# Patient Record
Sex: FEMALE | Race: BLACK OR AFRICAN AMERICAN | NOT HISPANIC OR LATINO | URBAN - METROPOLITAN AREA
[De-identification: names, ages, dates, MRNs, and addresses within clinical notes are randomized per-mention and may not be internally consistent; named-entity substitution may affect disease eponyms.]

---

## 2019-09-06 ENCOUNTER — OUTPATIENT (OUTPATIENT)
Dept: EMERGENCY DEPT | Facility: HOSPITAL | Age: 24
LOS: 1 days | End: 2019-09-06
Payer: MEDICAID

## 2019-09-06 VITALS
RESPIRATION RATE: 18 BRPM | OXYGEN SATURATION: 100 % | SYSTOLIC BLOOD PRESSURE: 93 MMHG | DIASTOLIC BLOOD PRESSURE: 55 MMHG | TEMPERATURE: 98 F | HEART RATE: 97 BPM

## 2019-09-06 PROCEDURE — 82962 GLUCOSE BLOOD TEST: CPT

## 2019-09-06 PROCEDURE — 99284 EMERGENCY DEPT VISIT MOD MDM: CPT

## 2019-09-06 PROCEDURE — 99285 EMERGENCY DEPT VISIT HI MDM: CPT

## 2019-09-06 NOTE — ED PROVIDER NOTE - CLINICAL SUMMARY MEDICAL DECISION MAKING FREE TEXT BOX
23 y/o F healthy 24 weeks pregnant presenting with generalized malaise, poor PO intake and food intake since pregnancy onset. Pt is HDS, looks well. Pt requesting discharge upon arrival. Convinced pt to stay for NST. Discussed with OB resident who will evaluate pt in LND and clear pregnancy. Will also check fingerstick prior to transfer. Doubt UTI, complication of pregnancy or hyperemesis gravidarum.

## 2019-09-06 NOTE — ED PROVIDER NOTE - OBJECTIVE STATEMENT
25 y/o  at 24 weeks presenting with generalized malaise,, lightheadedness and anorexia progressive since onset of pregnancy. Denies nausea, vomiting, diarrhea or constipation. Pt reports general aversion to most foods. Denies urinary symptoms. Pt received IV fluids by EMS PTA to ED. Positive fetal movement per patient

## 2019-09-10 DIAGNOSIS — O26.899 OTHER SPECIFIED PREGNANCY RELATED CONDITIONS, UNSPECIFIED TRIMESTER: ICD-10-CM

## 2019-09-10 DIAGNOSIS — Z3A.00 WEEKS OF GESTATION OF PREGNANCY NOT SPECIFIED: ICD-10-CM
